# Patient Record
Sex: MALE | Race: BLACK OR AFRICAN AMERICAN | NOT HISPANIC OR LATINO | URBAN - METROPOLITAN AREA
[De-identification: names, ages, dates, MRNs, and addresses within clinical notes are randomized per-mention and may not be internally consistent; named-entity substitution may affect disease eponyms.]

---

## 2021-02-11 ENCOUNTER — OUTPATIENT (OUTPATIENT)
Dept: OUTPATIENT SERVICES | Facility: HOSPITAL | Age: 76
LOS: 1 days | Discharge: HOME | End: 2021-02-11

## 2021-02-11 ENCOUNTER — INPATIENT (INPATIENT)
Facility: HOSPITAL | Age: 76
LOS: 0 days | Discharge: HOME HEALTH | End: 2021-02-12
Attending: INTERNAL MEDICINE | Admitting: INTERNAL MEDICINE
Payer: MEDICARE

## 2021-02-11 VITALS
RESPIRATION RATE: 18 BRPM | DIASTOLIC BLOOD PRESSURE: 72 MMHG | HEART RATE: 64 BPM | SYSTOLIC BLOOD PRESSURE: 158 MMHG | OXYGEN SATURATION: 95 %

## 2021-02-11 DIAGNOSIS — Z79.82 LONG TERM (CURRENT) USE OF ASPIRIN: ICD-10-CM

## 2021-02-11 DIAGNOSIS — S98.139A COMPLETE TRAUMATIC AMPUTATION OF ONE UNSPECIFIED LESSER TOE, INITIAL ENCOUNTER: Chronic | ICD-10-CM

## 2021-02-11 DIAGNOSIS — E78.00 PURE HYPERCHOLESTEROLEMIA, UNSPECIFIED: ICD-10-CM

## 2021-02-11 DIAGNOSIS — N18.9 CHRONIC KIDNEY DISEASE, UNSPECIFIED: ICD-10-CM

## 2021-02-11 DIAGNOSIS — I70.209 UNSPECIFIED ATHEROSCLEROSIS OF NATIVE ARTERIES OF EXTREMITIES, UNSPECIFIED EXTREMITY: ICD-10-CM

## 2021-02-11 DIAGNOSIS — E11.22 TYPE 2 DIABETES MELLITUS WITH DIABETIC CHRONIC KIDNEY DISEASE: ICD-10-CM

## 2021-02-11 DIAGNOSIS — Z79.02 LONG TERM (CURRENT) USE OF ANTITHROMBOTICS/ANTIPLATELETS: ICD-10-CM

## 2021-02-11 DIAGNOSIS — I12.9 HYPERTENSIVE CHRONIC KIDNEY DISEASE WITH STAGE 1 THROUGH STAGE 4 CHRONIC KIDNEY DISEASE, OR UNSPECIFIED CHRONIC KIDNEY DISEASE: ICD-10-CM

## 2021-02-11 LAB
ANION GAP SERPL CALC-SCNC: 14 MMOL/L — SIGNIFICANT CHANGE UP (ref 7–14)
BUN SERPL-MCNC: 65 MG/DL — CRITICAL HIGH (ref 10–20)
CALCIUM SERPL-MCNC: 8.4 MG/DL — LOW (ref 8.5–10.1)
CHLORIDE SERPL-SCNC: 101 MMOL/L — SIGNIFICANT CHANGE UP (ref 98–110)
CO2 SERPL-SCNC: 18 MMOL/L — SIGNIFICANT CHANGE UP (ref 17–32)
CREAT SERPL-MCNC: 4.2 MG/DL — CRITICAL HIGH (ref 0.7–1.5)
GLUCOSE BLDC GLUCOMTR-MCNC: 163 MG/DL — HIGH (ref 70–99)
GLUCOSE BLDC GLUCOMTR-MCNC: 173 MG/DL — HIGH (ref 70–99)
GLUCOSE BLDC GLUCOMTR-MCNC: 202 MG/DL — HIGH (ref 70–99)
GLUCOSE SERPL-MCNC: 180 MG/DL — HIGH (ref 70–99)
HCT VFR BLD CALC: 31.8 % — LOW (ref 42–52)
HGB BLD-MCNC: 9.9 G/DL — LOW (ref 14–18)
MCHC RBC-ENTMCNC: 27.1 PG — SIGNIFICANT CHANGE UP (ref 27–31)
MCHC RBC-ENTMCNC: 31.1 G/DL — LOW (ref 32–37)
MCV RBC AUTO: 87.1 FL — SIGNIFICANT CHANGE UP (ref 80–94)
NRBC # BLD: 0 /100 WBCS — SIGNIFICANT CHANGE UP (ref 0–0)
PLATELET # BLD AUTO: 191 K/UL — SIGNIFICANT CHANGE UP (ref 130–400)
POTASSIUM SERPL-MCNC: 5.1 MMOL/L — HIGH (ref 3.5–5)
POTASSIUM SERPL-SCNC: 5.1 MMOL/L — HIGH (ref 3.5–5)
RBC # BLD: 3.65 M/UL — LOW (ref 4.7–6.1)
RBC # FLD: 15.1 % — HIGH (ref 11.5–14.5)
SODIUM SERPL-SCNC: 133 MMOL/L — LOW (ref 135–146)
WBC # BLD: 6.28 K/UL — SIGNIFICANT CHANGE UP (ref 4.8–10.8)
WBC # FLD AUTO: 6.28 K/UL — SIGNIFICANT CHANGE UP (ref 4.8–10.8)

## 2021-02-11 PROCEDURE — 93010 ELECTROCARDIOGRAM REPORT: CPT

## 2021-02-11 PROCEDURE — 37228: CPT | Mod: RT

## 2021-02-11 PROCEDURE — 75710 ARTERY X-RAYS ARM/LEG: CPT | Mod: 26,XU

## 2021-02-11 PROCEDURE — 37224: CPT | Mod: LT

## 2021-02-11 PROCEDURE — 36246 INS CATH ABD/L-EXT ART 2ND: CPT | Mod: 59

## 2021-02-11 RX ORDER — SODIUM CHLORIDE 9 MG/ML
1000 INJECTION, SOLUTION INTRAVENOUS
Refills: 0 | Status: DISCONTINUED | OUTPATIENT
Start: 2021-02-11 | End: 2021-02-12

## 2021-02-11 RX ORDER — DEXTROSE 50 % IN WATER 50 %
25 SYRINGE (ML) INTRAVENOUS ONCE
Refills: 0 | Status: DISCONTINUED | OUTPATIENT
Start: 2021-02-11 | End: 2021-02-12

## 2021-02-11 RX ORDER — CLOPIDOGREL BISULFATE 75 MG/1
1 TABLET, FILM COATED ORAL
Qty: 30 | Refills: 1
Start: 2021-02-11 | End: 2021-04-11

## 2021-02-11 RX ORDER — SODIUM CHLORIDE 9 MG/ML
1000 INJECTION INTRAMUSCULAR; INTRAVENOUS; SUBCUTANEOUS
Refills: 0 | Status: DISCONTINUED | OUTPATIENT
Start: 2021-02-11 | End: 2021-02-12

## 2021-02-11 RX ORDER — GLUCAGON INJECTION, SOLUTION 0.5 MG/.1ML
1 INJECTION, SOLUTION SUBCUTANEOUS ONCE
Refills: 0 | Status: DISCONTINUED | OUTPATIENT
Start: 2021-02-11 | End: 2021-02-12

## 2021-02-11 RX ORDER — INSULIN GLARGINE 100 [IU]/ML
15 INJECTION, SOLUTION SUBCUTANEOUS AT BEDTIME
Refills: 0 | Status: DISCONTINUED | OUTPATIENT
Start: 2021-02-11 | End: 2021-02-12

## 2021-02-11 RX ORDER — ATORVASTATIN CALCIUM 80 MG/1
40 TABLET, FILM COATED ORAL AT BEDTIME
Refills: 0 | Status: DISCONTINUED | OUTPATIENT
Start: 2021-02-11 | End: 2021-02-12

## 2021-02-11 RX ORDER — AMLODIPINE BESYLATE 2.5 MG/1
5 TABLET ORAL DAILY
Refills: 0 | Status: DISCONTINUED | OUTPATIENT
Start: 2021-02-11 | End: 2021-02-12

## 2021-02-11 RX ORDER — DEXTROSE 50 % IN WATER 50 %
15 SYRINGE (ML) INTRAVENOUS ONCE
Refills: 0 | Status: DISCONTINUED | OUTPATIENT
Start: 2021-02-11 | End: 2021-02-12

## 2021-02-11 RX ORDER — DEXTROSE 50 % IN WATER 50 %
12.5 SYRINGE (ML) INTRAVENOUS ONCE
Refills: 0 | Status: DISCONTINUED | OUTPATIENT
Start: 2021-02-11 | End: 2021-02-12

## 2021-02-11 RX ORDER — ASPIRIN/CALCIUM CARB/MAGNESIUM 324 MG
1 TABLET ORAL
Qty: 30 | Refills: 1
Start: 2021-02-11 | End: 2021-04-11

## 2021-02-11 RX ORDER — CLOPIDOGREL BISULFATE 75 MG/1
75 TABLET, FILM COATED ORAL DAILY
Refills: 0 | Status: DISCONTINUED | OUTPATIENT
Start: 2021-02-12 | End: 2021-02-12

## 2021-02-11 RX ORDER — ASPIRIN/CALCIUM CARB/MAGNESIUM 324 MG
81 TABLET ORAL DAILY
Refills: 0 | Status: DISCONTINUED | OUTPATIENT
Start: 2021-02-12 | End: 2021-02-12

## 2021-02-11 RX ORDER — INSULIN LISPRO 100/ML
VIAL (ML) SUBCUTANEOUS
Refills: 0 | Status: DISCONTINUED | OUTPATIENT
Start: 2021-02-11 | End: 2021-02-12

## 2021-02-11 RX ADMIN — Medication 1: at 17:41

## 2021-02-11 RX ADMIN — INSULIN GLARGINE 15 UNIT(S): 100 INJECTION, SOLUTION SUBCUTANEOUS at 22:45

## 2021-02-11 RX ADMIN — ATORVASTATIN CALCIUM 40 MILLIGRAM(S): 80 TABLET, FILM COATED ORAL at 22:45

## 2021-02-11 RX ADMIN — SODIUM CHLORIDE 100 MILLILITER(S): 9 INJECTION INTRAMUSCULAR; INTRAVENOUS; SUBCUTANEOUS at 15:15

## 2021-02-11 NOTE — CHART NOTE - NSCHARTNOTEFT_GEN_A_CORE
INDICATION:                          CLI right leg                           Emanuel class 5    PHYSICIAN: Dr Brizuela, DR Hill  ASSISTANT/FELLOW: Jared    ACCESS: Ultrasound Guided  Left femoral artery access    VASCULAR CLOSURE DEVICE (if applicable):    ANGIOGRAM:  Selective Abdominal Aorta, Right Iliac, Right SFA, Right Lower Extremity DSA angiography     DETAILED PROCEDURE SUMMARY:  After obtaining informed consent, the patient was brought to the catheterization suite in a post- absorptive and non-sedated state. After this, a timeout was performed and I administered moderate sedation throughout this 45-minute procedure. An independent trained observer pushed medications at my direction, and monitored the patient’s level of consciousness and physiological status throughout. The patient was prepped and draped in the usual sterile manner. 1% lidocaine was used for local anesthesia and the patient was sedated as per endovascular lab protocol. Access was obtained in the Left  Femoral Artery using the modified Seldinger technique  5 [place] was placed in the artery under fluoroscopy and ultrasound guidance which was utilized for assessment of arterial patency and actual real-time visualization of needle passage to the arterial lumen.     CONTRAST: 40 ml    DIAGNOSTIC FINDINGS    Abdominal Aorta: patent   Right Common Iliac Artery: patent   Right External Iliac Artery: patent  Right Common Femoral Artery: patent   Right SFA Artery:  moderate disease Distal 70% lesion   Right Profunda Artery: moderate disease  Right Popliteal Artery: 80% diffuse lesion   Right Tibial Peroneal Trunk Artery: 90% diffuse disease  Reft Anterior Tibial Artery: severe disease occluded distally supplied by collaterals   Right Peroneal Artery: 90-95% diffuse disease, 100% in distally  with blood reflow distally from prox collaterals   Right Posterior Tibial Artery: severe disease  Left Common Iliac Artery: Patent         COMPLICATION: none     PERIPHERAL DIAGNOSTIC ANGIOGRAM/INTERVENTION SUMMARY:    -Significant right popliteal lesion   -Significant right TP trunk lesion   -Significant right peroneal diffuse lesion,   -Occluded AT and severely disease PT.     Intervention:  -Successful PCI to right TP trunk/peroneal with balloon angioplasty  -Successful PCI to right Distal SFA/popliteal lesion with DCB      RECOMMENDATIONs:  -admit for observation   -IV hydration monitor kidney function  -ASA 81 mg daily  -plavix 75 mg daily  -continue statin and CCB  -Plan to DC tera if stable

## 2021-02-11 NOTE — H&P CARDIOLOGY - HISTORY OF PRESENT ILLNESS
Patient is a 75y Male PMH: HTN, HLD, DM, CKD IV, R foot ulcer, multiple toe amputations R foot, PVD, [place]. Pt here with Benito Cat5-nonhealing ulcer, arterial duplex reveals  of B/L PTA and right peroneal artery, decreased velocities of b/l AT arteries, diminished arterial flow of b/l DPA, peripheral angiogram recommended. Of note,       Vital Signs Last 24 Hrs  T(C): --  T(F): --  HR: --72  BP: --178/83  BP(mean): --133  RR: --17  SpO2: --99          REVIEW OF SYSTEMS:  CONSTITUTIONAL: No fever, weight loss, or fatigue  CARDIOLOGY: PAtient denies chest pain, shortness of breath or syncopal episodes.   RESPIRATORY: denies shortness of breath, wheezing.   NEUROLOGICAL: NO weakness, no focal deficits to report.  ENDOCRINOLOGICAL: no recent change in diabetic medications.   GI: no BRBPR, no N,V,diarrhea.     PHYSICAL EXAM:  · CONSTITUTIONAL:	Well-developed, well nourished      ·RESPIRATORY:   airway patent; breath sounds equal; good air movement; respirations non-labored; clear to auscultation bilaterally; no chest wall tenderness; no intercostal retractions; no rales,rhonchi or wheeze  · CARDIOVASCULAR	regular rate and rhythm  no rub  no murmur  normal PMI  · EXTREMITIES: No cyanosis, clubbing or edema, R toe 2-4 amputation   · VASCULAR: 	DP absent , 1+ PT  	         Patient is a 75y Male PMH: HTN, HLD, DM, CKD IV, R foot ulcer, multiple toe amputations R foot, PVD, [place]. Pt here with Benito Cat5-nonhealing ulcer, arterial duplex reveals  of B/L PTA and right peroneal artery, decreased velocities of b/l AT arteries, diminished arterial flow of b/l DPA, peripheral angiogram recommended. Of note, pt is extremely [place] and poor historian.       Vital Signs Last 24 Hrs  T(C): --  T(F): --  HR: --72  BP: --178/83  BP(mean): --133  RR: --17  SpO2: --99          REVIEW OF SYSTEMS:  CONSTITUTIONAL: No fever, weight loss, or fatigue  CARDIOLOGY: PAtient denies chest pain, shortness of breath or syncopal episodes.   RESPIRATORY: denies shortness of breath, wheezing.   NEUROLOGICAL: NO weakness, no focal deficits to report.  ENDOCRINOLOGICAL: no recent change in diabetic medications.   GI: no BRBPR, no N,V,diarrhea.     PHYSICAL EXAM:  · CONSTITUTIONAL:	Well-developed, well nourished      ·RESPIRATORY:   airway patent; breath sounds equal; good air movement; respirations non-labored; clear to auscultation bilaterally; no chest wall tenderness; no intercostal retractions; no rales,rhonchi or wheeze  · CARDIOVASCULAR	regular rate and rhythm  no rub  no murmur  normal PMI  · EXTREMITIES: No cyanosis, clubbing or edema, R toe 2-4 amputation   · VASCULAR: 	DP absent , 1+ PT

## 2021-02-11 NOTE — ASU PATIENT PROFILE, ADULT - PMH
CKD (chronic kidney disease), stage IV    Diabetes    HLD (hyperlipidemia)    [place] (hard of hearing)  does not perform sign language, does not wear any assistive devices  HTN (hypertension)    PAD (peripheral artery disease)    PVD (peripheral vascular disease) with claudication

## 2021-02-12 VITALS — HEART RATE: 62 BPM | SYSTOLIC BLOOD PRESSURE: 152 MMHG | DIASTOLIC BLOOD PRESSURE: 76 MMHG

## 2021-02-12 LAB
A1C WITH ESTIMATED AVERAGE GLUCOSE RESULT: 6.8 % — HIGH (ref 4–5.6)
ANION GAP SERPL CALC-SCNC: 9 MMOL/L — SIGNIFICANT CHANGE UP (ref 7–14)
BASOPHILS # BLD AUTO: 0.02 K/UL — SIGNIFICANT CHANGE UP (ref 0–0.2)
BASOPHILS NFR BLD AUTO: 0.4 % — SIGNIFICANT CHANGE UP (ref 0–1)
BUN SERPL-MCNC: 71 MG/DL — CRITICAL HIGH (ref 10–20)
CALCIUM SERPL-MCNC: 8.1 MG/DL — LOW (ref 8.5–10.1)
CHLORIDE SERPL-SCNC: 109 MMOL/L — SIGNIFICANT CHANGE UP (ref 98–110)
CO2 SERPL-SCNC: 17 MMOL/L — SIGNIFICANT CHANGE UP (ref 17–32)
CREAT SERPL-MCNC: 3.4 MG/DL — HIGH (ref 0.7–1.5)
EOSINOPHIL # BLD AUTO: 0.12 K/UL — SIGNIFICANT CHANGE UP (ref 0–0.7)
EOSINOPHIL NFR BLD AUTO: 2.2 % — SIGNIFICANT CHANGE UP (ref 0–8)
ESTIMATED AVERAGE GLUCOSE: 148 MG/DL — HIGH (ref 68–114)
GLUCOSE BLDC GLUCOMTR-MCNC: 259 MG/DL — HIGH (ref 70–99)
GLUCOSE BLDC GLUCOMTR-MCNC: 94 MG/DL — SIGNIFICANT CHANGE UP (ref 70–99)
GLUCOSE SERPL-MCNC: 97 MG/DL — SIGNIFICANT CHANGE UP (ref 70–99)
HCT VFR BLD CALC: 31.1 % — LOW (ref 42–52)
HGB BLD-MCNC: 9.8 G/DL — LOW (ref 14–18)
IMM GRANULOCYTES NFR BLD AUTO: 0.4 % — HIGH (ref 0.1–0.3)
LYMPHOCYTES # BLD AUTO: 1.25 K/UL — SIGNIFICANT CHANGE UP (ref 1.2–3.4)
LYMPHOCYTES # BLD AUTO: 22.9 % — SIGNIFICANT CHANGE UP (ref 20.5–51.1)
MCHC RBC-ENTMCNC: 27.2 PG — SIGNIFICANT CHANGE UP (ref 27–31)
MCHC RBC-ENTMCNC: 31.5 G/DL — LOW (ref 32–37)
MCV RBC AUTO: 86.4 FL — SIGNIFICANT CHANGE UP (ref 80–94)
MONOCYTES # BLD AUTO: 0.49 K/UL — SIGNIFICANT CHANGE UP (ref 0.1–0.6)
MONOCYTES NFR BLD AUTO: 9 % — SIGNIFICANT CHANGE UP (ref 1.7–9.3)
NEUTROPHILS # BLD AUTO: 3.55 K/UL — SIGNIFICANT CHANGE UP (ref 1.4–6.5)
NEUTROPHILS NFR BLD AUTO: 65.1 % — SIGNIFICANT CHANGE UP (ref 42.2–75.2)
NRBC # BLD: 0 /100 WBCS — SIGNIFICANT CHANGE UP (ref 0–0)
PLATELET # BLD AUTO: 161 K/UL — SIGNIFICANT CHANGE UP (ref 130–400)
POTASSIUM SERPL-MCNC: 5.2 MMOL/L — HIGH (ref 3.5–5)
POTASSIUM SERPL-SCNC: 5.2 MMOL/L — HIGH (ref 3.5–5)
RBC # BLD: 3.6 M/UL — LOW (ref 4.7–6.1)
RBC # FLD: 15.1 % — HIGH (ref 11.5–14.5)
SODIUM SERPL-SCNC: 135 MMOL/L — SIGNIFICANT CHANGE UP (ref 135–146)
WBC # BLD: 5.45 K/UL — SIGNIFICANT CHANGE UP (ref 4.8–10.8)
WBC # FLD AUTO: 5.45 K/UL — SIGNIFICANT CHANGE UP (ref 4.8–10.8)

## 2021-02-12 PROCEDURE — 99232 SBSQ HOSP IP/OBS MODERATE 35: CPT

## 2021-02-12 RX ORDER — INSULIN GLARGINE 100 [IU]/ML
0 INJECTION, SOLUTION SUBCUTANEOUS
Qty: 0 | Refills: 0 | DISCHARGE

## 2021-02-12 RX ORDER — AMLODIPINE BESYLATE 2.5 MG/1
1 TABLET ORAL
Qty: 0 | Refills: 0 | DISCHARGE

## 2021-02-12 RX ORDER — AMLODIPINE BESYLATE 2.5 MG/1
5 TABLET ORAL ONCE
Refills: 0 | Status: COMPLETED | OUTPATIENT
Start: 2021-02-12 | End: 2021-02-12

## 2021-02-12 RX ORDER — HYDROCHLOROTHIAZIDE 25 MG
25 TABLET ORAL DAILY
Refills: 0 | Status: DISCONTINUED | OUTPATIENT
Start: 2021-02-12 | End: 2021-02-12

## 2021-02-12 RX ORDER — AMLODIPINE BESYLATE 2.5 MG/1
1 TABLET ORAL
Qty: 0 | Refills: 0 | DISCHARGE
Start: 2021-02-12

## 2021-02-12 RX ORDER — ATORVASTATIN CALCIUM 80 MG/1
1 TABLET, FILM COATED ORAL
Qty: 0 | Refills: 0 | DISCHARGE

## 2021-02-12 RX ORDER — INSULIN GLARGINE 100 [IU]/ML
7 INJECTION, SOLUTION SUBCUTANEOUS
Qty: 0 | Refills: 0 | DISCHARGE

## 2021-02-12 RX ADMIN — CLOPIDOGREL BISULFATE 75 MILLIGRAM(S): 75 TABLET, FILM COATED ORAL at 11:44

## 2021-02-12 RX ADMIN — AMLODIPINE BESYLATE 5 MILLIGRAM(S): 2.5 TABLET ORAL at 06:10

## 2021-02-12 RX ADMIN — Medication 81 MILLIGRAM(S): at 11:44

## 2021-02-12 RX ADMIN — Medication 25 MILLIGRAM(S): at 10:39

## 2021-02-12 RX ADMIN — Medication 3: at 11:44

## 2021-02-12 RX ADMIN — AMLODIPINE BESYLATE 5 MILLIGRAM(S): 2.5 TABLET ORAL at 07:01

## 2021-02-12 NOTE — DISCHARGE NOTE PROVIDER - NSDCACTIVITY_GEN_ALL_CORE
Showering allowed/No heavy lifting/straining Do not drive or operate machinery/Showering allowed/No heavy lifting/straining

## 2021-02-12 NOTE — DISCHARGE NOTE PROVIDER - NSDCCPTREATMENT_GEN_ALL_CORE_FT
PRINCIPAL PROCEDURE  Procedure: Angiogram, peripheral, with PTA if indicated  Findings and Treatment: continue medical regimen as prescribed

## 2021-02-12 NOTE — DISCHARGE NOTE PROVIDER - NSDCCPCAREPLAN_GEN_ALL_CORE_FT
PRINCIPAL DISCHARGE DIAGNOSIS  Diagnosis: PAD (peripheral artery disease)  Assessment and Plan of Treatment: continue medical regimen as prescribed

## 2021-02-12 NOTE — DISCHARGE NOTE PROVIDER - NSDCCPGOAL_GEN_ALL_CORE_FT
continue to medical regimen to prevent lower extremity pain. To get better and follow your care plan as instructed.

## 2021-02-12 NOTE — DISCHARGE NOTE PROVIDER - HOSPITAL COURSE
Patient is a 75y Male PMH: HTN, HLD, DM, CKD IV, R foot ulcer, multiple toe amputations R foot, PVD, [place]. Pt here with Benito Cat5-nonhealing ulcer, arterial duplex reveals  of B/L PTA and right peroneal artery, decreased velocities of b/l AT arteries, diminished arterial flow of b/l DPA, peripheral angiogram recommended. Of note, pt is extremely [place] and poor historian.         S/P PTA:   PERIPHERAL DIAGNOSTIC ANGIOGRAM/INTERVENTION SUMMARY:    -Significant right popliteal lesion   -Significant right TP trunk lesion   -Significant right peroneal diffuse lesion,   -Occluded AT and severely disease PT.     Intervention:  -Successful PCI to right TP trunk/peroneal with balloon angioplasty  -Successful PCI to right Distal SFA/popliteal lesion with DCB      RECOMMENDATIONs:  -admit for observation   -IV hydration monitor kidney function  -ASA 81 mg daily  -plavix 75 mg daily  -continue statin and CCB  -Plan to DC tera if stable.      Patient is a 75y Male PMH: HTN, HLD, DM, CKD IV, R foot ulcer, multiple toe amputations R foot, PVD, [place]. Pt here with Benito Cat5-nonhealing ulcer, arterial duplex reveals  of B/L PTA and right peroneal artery, decreased velocities of b/l AT arteries, diminished arterial flow of b/l DPA, peripheral angiogram recommended. Of note, pt is extremely [place] and poor historian.         S/P PTA:   PERIPHERAL DIAGNOSTIC ANGIOGRAM/INTERVENTION SUMMARY:    -Significant right popliteal lesion   -Significant right TP trunk lesion   -Significant right peroneal diffuse lesion,   -Occluded AT and severely disease PT.     Intervention:  -Successful PCI to right TP trunk/peroneal with balloon angioplasty  -Successful PCI to right Distal SFA/popliteal lesion with DCB      RECOMMENDATIONs:  -admit for observation   -IV hydration monitor kidney function  -ASA 81 mg daily  -plavix 75 mg daily  -continue statin and CCB  -Plan to DC home if stable.

## 2021-02-12 NOTE — DISCHARGE NOTE NURSING/CASE MANAGEMENT/SOCIAL WORK - PATIENT PORTAL LINK FT
You can access the FollowMyHealth Patient Portal offered by [facility] by registering at the following website: [email or web address]. By joining [facility]’s FollowMyHealth portal, you will also be able to view your health information using other applications (apps) compatible with our system.

## 2021-02-12 NOTE — DISCHARGE NOTE PROVIDER - CARE PROVIDER_API CALL
Iam Brizuela (MD)  Cardiovascular Disease; Endovascular Medicine; Interventional Cardiology; Vascular Medicine  [address] [place] [address], [place] [address]  Phone: [phone]  Fax: [phone]  Follow Up Time: 1 week

## 2021-02-12 NOTE — DISCHARGE NOTE PROVIDER - NSDCFUADDINST_GEN_ALL_CORE_FT
Discharge instructions as follows:  - No strenuous activity for 3 days (routine walking okay)  - No heavy lifting >30lbs for 2 weeks  - Some swelling to the leg is expected  - May shower in 24 hours, no baths or pools  - Leave dressing in place for 24-48 hours, if does not fall off on own may remove after 48 hours  -** follow up CLI/wound x1 week

## 2021-02-12 NOTE — DISCHARGE NOTE PROVIDER - NSDCMRMEDTOKEN_GEN_ALL_CORE_FT
amLODIPine 5 mg oral tablet: 1 tab(s) orally once a day  aspirin 81 mg oral tablet, chewable: 1 tab(s) chewed once a day MDD:1  clopidogrel 75 mg oral tablet: 1 tab(s) orally once a day MDD:1  glipiZIDE 5 mg oral tablet, extended release: 1 tab(s) orally once a day  hydroCHLOROthiazide 25 mg oral tablet: 1 tab(s) orally once a day  Lantus 100 units/mL subcutaneous solution:   Lipitor 40 mg oral tablet: 1 tab(s) orally once a day   amLODIPine 10 mg oral tablet: 1 tab(s) orally once a day  aspirin 81 mg oral tablet, chewable: 1 tab(s) chewed once a day MDD:1  clopidogrel 75 mg oral tablet: 1 tab(s) orally once a day MDD:1  glipiZIDE 5 mg oral tablet, extended release: 1 tab(s) orally once a day  hydroCHLOROthiazide 25 mg oral tablet: 1 tab(s) orally once a day  Lantus 100 units/mL subcutaneous solution: 7 unit(s) subcutaneous once a day (at bedtime)  Lipitor 40 mg oral tablet: 1 tab(s) orally once a day

## 2021-02-12 NOTE — PROGRESS NOTE ADULT - SUBJECTIVE AND OBJECTIVE BOX
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 Pt here with [place] Cat5-nonhealing ulcer, arterial duplex reveals  of B/L PTA and right peroneal artery, decreased velocities of b/l AT arteries, diminished arterial flow of b/l DPA, peripheral angiogram recommended. Of note, pt is extremely [place] and poor historian.       Vital Signs Last 24 Hrs  T(C): --  T(F): --  HR: --72  BP: --178/83  BP(mean): --133  RR: --17  SpO2: --99          REVIEW OF SYSTEMS:  CONSTITUTIONAL: No fever, weight loss, or fatigue  CARDIOLOGY: PAtient denies chest pain, shortness of breath or syncopal episodes.   RESPIRATORY: denies shortness of breath, wheezing.   NEUROLOGICAL: NO weakness, no focal deficits to report.  ENDOCRINOLOGICAL: no recent change in diabetic medications.   GI: no BRBPR, no N,V,diarrhea.     PHYSICAL EXAM:  · CONSTITUTIONAL:	Well-developed, well nourished      ·RESPIRATORY:   airway patent; breath sounds equal; good air movement; respirations non-labored; clear to auscultation bilaterally; no chest wall tenderness; no intercostal retractions; no rales,rhonchi or wheeze  · CARDIOVASCULAR	regular rate and rhythm  no rub  no murmur  normal PMI  · EXTREMITIES: No cyanosis, clubbing or edema, R toe 2-4 amputation   · VASCULAR: 	DP absent , 1+ PT  	         (11 Feb 2021 10:44)    Allergies    No Known Allergies    Intolerances      Patient without complaints. Pt ambulated without issues/symptoms  Denies LE pain and swelling,  SOB, palpitations, or dizziness  No events on telemetry overnight    Vital Signs Last 24 Hrs  T(C): 35.7 (12 Feb 2021 05:00), Max: 36.7 (11 Feb 2021 20:24)  T(F): 96.2 (12 Feb 2021 05:00), Max: 98 (11 Feb 2021 20:24)  HR: 66 (12 Feb 2021 05:00) (64 - 68)  BP: 188/74 (12 Feb 2021 05:00) (158/72 - 188/74)  BP(mean): --  RR: 18 (12 Feb 2021 05:00) (18 - 18)  SpO2: 95% (11 Feb 2021 17:03) (95% - 95%)    MEDICATIONS  (STANDING):  amLODIPine   Tablet 5 milliGRAM(s) Oral daily  aspirin  chewable 81 milliGRAM(s) Oral daily  atorvastatin 40 milliGRAM(s) Oral at bedtime  clopidogrel Tablet 75 milliGRAM(s) Oral daily  dextrose 40% Gel 15 Gram(s) Oral once  dextrose 5%. 1000 milliLiter(s) (50 mL/Hr) IV Continuous <Continuous>  dextrose 5%. 1000 milliLiter(s) (100 mL/Hr) IV Continuous <Continuous>  dextrose 50% Injectable 25 Gram(s) IV Push once  dextrose 50% Injectable 12.5 Gram(s) IV Push once  dextrose 50% Injectable 25 Gram(s) IV Push once  glucagon  Injectable 1 milliGRAM(s) IntraMuscular once  insulin glargine Injectable (LANTUS) 15 Unit(s) SubCutaneous at bedtime  insulin lispro (ADMELOG) corrective regimen sliding scale   SubCutaneous three times a day before meals  sodium chloride 0.9%. 1000 milliLiter(s) (100 mL/Hr) IV Continuous <Continuous>    MEDICATIONS  (PRN):      REVIEW OF SYSTEMS:          All negative except as mentioned in HPI    PHYSICAL EXAM:           CONSTITUTIONAL: Well-developed; well-nourished; in no acute distress  	SKIN: warm, dry  	HEAD: Normocephalic; atraumatic  	EYES: PERRL.  	ENT: No nasal discharge, airway clear, mucous membranes moist  	NECK: Supple; non tender.  	CARD: +S1, +S2, no murmurs, gallops, or rubs. Regular rate and rhythm    	RESP: No wheezes, rales or rhonchi. CTA B/L  	ABD: soft ntnd, + BS x 4 quadrants  	EXT: moves all extremities,  no clubbing, cyanosis or edema  	NEURO: Alert and oriented x3, no focal deficits          PSYCH: Cooperative, appropriate          VASCULAR:  + Rad / + PTs / + DPs          EXTREMITY:             Left Groin:  Dressing D/C/I, pressure dressing removed, access site soft, no hematoma, no pain, + pulses, no sign of infection, no numbness  	    LABS: P                        9.9    6.28  )-----------( 191      ( 11 Feb 2021 11:10 )             31.8     02-11    133<L>  |  101  |  65<HH>  ----------------------------<  180<H>  5.1<H>   |  18  |  4.2<HH>    Ca    8.4<L>      11 Feb 2021 11:10              A/P:  I discussed the case with Cardiologist    and recommend the following:    S/P PTA:   PERIPHERAL DIAGNOSTIC ANGIOGRAM/INTERVENTION SUMMARY:    -Significant right popliteal lesion   -Significant right TP trunk lesion   -Significant right peroneal diffuse lesion,   -Occluded AT and severely disease PT.     Intervention:  -Successful PCI to right TP trunk/peroneal with balloon angioplasty  -Successful PCI to right Distal SFA/popliteal lesion with DCB      RECOMMENDATIONs:  -admit for observation   -IV hydration monitor kidney function  -ASA 81 mg daily  -plavix 75 mg daily  -continue statin and CCB  -Plan to DC tera if stable.     	     Continue DAPT ( Aspirin 81 mg PO Daily and Plavix 75 mg daily  ),  B-Blocker, Statin Therapy                   Patient given 30 day supply of ( Aspirin 81 mg daily and Plavix 75 mg daily ) to take at home                   Monitor access site                   No strenuous activity for 3 days (routine walking okay)                   No driving for 3 days                   No heavy lifting > 30lbs for 2 weeks                   May shower in 24 hours                   Leave dressing in place 24- 48 hours, if does not fall off in 48 hours can remove                   Patient agreeing to take DAPT as directed by cardiologist                    Post angiogram instructions, access site care and activity restrictions reviewed with patient                     Discussed with patient to return to hospital if experience severe lower extremity pain and site bleeding                   Aggressive risk factor modification, diet counseling, smoking cessation discussed with patient                      Can discharge patient @-- from cardiovascular standpoint after ambulating without symptoms, access site wnl, labs and ECG reviewed                    Follow up with Cardiology        in two weeks.  Instructed to call and make an appointment                                         Vascular Cardiology Follow up    SHARONA CAN   75y Male  PAST MEDICAL & SURGICAL HISTORY:  PVD (peripheral vascular disease) with claudication    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. Pt here with [place] Cat5-nonhealing ulcer, arterial duplex reveals  of B/L PTA and right peroneal artery, decreased velocities of b/l AT arteries, diminished arterial flow of b/l DPA, peripheral angiogram recommended. Of note, pt is extremely [place] and poor historian.       Vital Signs Last 24 Hrs  T(C): --  T(F): --  HR: --72  BP: --178/83  BP(mean): --133  RR: --17  SpO2: --99          REVIEW OF SYSTEMS:  CONSTITUTIONAL: No fever, weight loss, or fatigue  CARDIOLOGY: PAtient denies chest pain, shortness of breath or syncopal episodes.   RESPIRATORY: denies shortness of breath, wheezing.   NEUROLOGICAL: NO weakness, no focal deficits to report.  ENDOCRINOLOGICAL: no recent change in diabetic medications.   GI: no BRBPR, no N,V,diarrhea.     PHYSICAL EXAM:  · CONSTITUTIONAL:	Well-developed, well nourished      ·RESPIRATORY:   airway patent; breath sounds equal; good air movement; respirations non-labored; clear to auscultation bilaterally; no chest wall tenderness; no intercostal retractions; no rales,rhonchi or wheeze  · CARDIOVASCULAR	regular rate and rhythm  no rub  no murmur  normal PMI  · EXTREMITIES: No cyanosis, clubbing or edema, R toe 2-4 amputation   · VASCULAR: 	DP absent , 1+ PT  	         (11 Feb 2021 10:44)    Allergies    No Known Allergies    Intolerances      Patient without complaints. Pt ambulated without issues/symptoms  Denies LE pain and swelling,  SOB, palpitations, or dizziness  No events on telemetry overnight    Vital Signs Last 24 Hrs  T(C): 35.7 (12 Feb 2021 05:00), Max: 36.7 (11 Feb 2021 20:24)  T(F): 96.2 (12 Feb 2021 05:00), Max: 98 (11 Feb 2021 20:24)  HR: 66 (12 Feb 2021 05:00) (64 - 68)  BP: 188/74 (12 Feb 2021 05:00) (158/72 - 188/74)  BP(mean): --  RR: 18 (12 Feb 2021 05:00) (18 - 18)  SpO2: 95% (11 Feb 2021 17:03) (95% - 95%)    MEDICATIONS  (STANDING):  amLODIPine   Tablet 5 milliGRAM(s) Oral daily  aspirin  chewable 81 milliGRAM(s) Oral daily  atorvastatin 40 milliGRAM(s) Oral at bedtime  clopidogrel Tablet 75 milliGRAM(s) Oral daily  dextrose 40% Gel 15 Gram(s) Oral once  dextrose 5%. 1000 milliLiter(s) (50 mL/Hr) IV Continuous <Continuous>  dextrose 5%. 1000 milliLiter(s) (100 mL/Hr) IV Continuous <Continuous>  dextrose 50% Injectable 25 Gram(s) IV Push once  dextrose 50% Injectable 12.5 Gram(s) IV Push once  dextrose 50% Injectable 25 Gram(s) IV Push once  glucagon  Injectable 1 milliGRAM(s) IntraMuscular once  insulin glargine Injectable (LANTUS) 15 Unit(s) SubCutaneous at bedtime  insulin lispro (ADMELOG) corrective regimen sliding scale   SubCutaneous three times a day before meals  sodium chloride 0.9%. 1000 milliLiter(s) (100 mL/Hr) IV Continuous <Continuous>    MEDICATIONS  (PRN):      REVIEW OF SYSTEMS:          All negative except as mentioned in HPI    PHYSICAL EXAM:           CONSTITUTIONAL: Well-developed; well-nourished; in no acute distress  	SKIN: warm, dry  	HEAD: Normocephalic; atraumatic  	EYES: PERRL.  	ENT: No nasal discharge, airway clear, mucous membranes moist  	NECK: Supple; non tender.  	CARD: +S1, +S2, no murmurs, gallops, or rubs. Regular rate and rhythm    	RESP: No wheezes, rales or rhonchi. CTA B/L  	ABD: soft ntnd, + BS x 4 quadrants  	EXT: moves all extremities,  no clubbing, cyanosis or edema  	NEURO: Alert and oriented x3, no focal deficits          PSYCH: Cooperative, appropriate          VASCULAR:  + Rad / + PTs / + DPs          EXTREMITY:             Left Groin:  Dressing changed new dsg D/C/I, dressing to be  removed in 24 hrs if does not fall off, access site soft, no hematoma, no pain, + pulses, no sign of infection, no numbness  	    LABS:   Complete Blood Count + Automated Diff   02.12.21 @ 04:30   WBC Count K/uL   5.45   RBC Count M/uL   3.60   Hemoglobin g/dL   9.8   Hematocrit %   31.1   Mean Cell Volume fL   86.4   Mean Cell Hemoglobin pg   27.2   Mean Cell Hemoglobin Conc g/dL   31.5   Red Cell Distrib Width %   15.1   Platelet Count - Automated K/uL   161   Auto Neutrophil # K/uL   3.55   Auto Lymphocyte # K/uL   1.25   Auto Monocyte # K/uL   0.49   Auto Eosinophil # K/uL   0.12   Auto Basophil # K/uL   0.02   Auto Neutrophil % %   65.1: Differential percentages must be correlated with absolute numbers for clinical significance.   Auto Lymphocyte % %   22.9   Auto Monocyte % %   9.0   Auto Eosinophil % %   2.2   Auto Basophil % %   0.4   Auto Immature Granulocyte % %   0.4   Nucleated RBC /100 WBCs   0   Basic Metabolic Panel   02.12.21 @ 04:30   Sodium, Serum mmol/L   135   Potassium, Serum mmol/L   5.2   Chloride, Serum mmol/L   109   Carbon Dioxide, Serum mmol/L   17   Anion Gap, Serum mmol/L   9   Blood Urea Nitrogen, Serum mg/dL   71: Critical value:   Creatinine, Serum mg/dL   3.4   Glucose, Serum mg/dL   97   Calcium, Total Serum mg/dL   8.1   eGFR if Non African American mL/min/1.73M2   17: Interpretative comment The units for eGFR are mL/min/1.73M2 (normalized body surface area). The eGFR is calculated from a serum creatinine using the CKD-EPI equation. Other variables required for calculation are race, age and sex. Among patients with chronic kidney disease (CKD), the eGFR is useful in determining the stage of disease according to KDOQI CKD classification. All eGFR results are reported numerically with the following interpretation. GFR With Without (ml/min/1.73 m2) Kidney Damage Kidney Damage >= 90 Stage 1 Normal 60-89 Stage 2 Decreased GFR 30-59 Stage 3 Stage 3 15-29 Stage 4 Stage 4 < 15 Stage 5 Stage 5 Each stage of CKD assumes that the associated GFR level has been in effect for at least 3 months. Determination of stages one and two (with eGFR > 59 ml/min/m2) requires estimation of kidney damage for at least 3 months as defined by structural or functional abnormalities. Limitations: All estimates of GFR will be less accurate for patients at extremes of muscle mass (including but not limited to frail elderly, critically ill, or cancer patients), those with unusual diets, and those with conditions associated with reduced secretion or extrarenal elimination of creatinine. The eGFR equation is not recommended for use in patients with unstable creatinine levels.   eGFR if African American mL/min/1.73M2   19                        9.9    6.28  )-----------( 191      ( 11 Feb 2021 11:10 )             31.8     02-11    133<L>  |  101  |  65<HH>  ----------------------------<  180<H>  5.1<H>   |  18  |  4.2<HH>    Ca    8.4<L>      11 Feb 2021 11:10              A/P:  I discussed the case with Cardiologist    and recommend the following:    S/P PTA:   PERIPHERAL DIAGNOSTIC ANGIOGRAM/INTERVENTION SUMMARY:    -Significant right popliteal lesion   -Significant right TP trunk lesion   -Significant right peroneal diffuse lesion,   -Occluded AT and severely disease PT.     Intervention:  -Successful PCI to right TP trunk/peroneal with balloon angioplasty  -Successful PCI to right Distal SFA/popliteal lesion with DCB      RECOMMENDATIONs:  -admit for observation   -IV hydration monitor kidney function  -ASA 81 mg daily  -plavix 75 mg daily  -continue statin and CCB  -Plan to DC tera if stable.     	     Continue DAPT ( Aspirin 81 mg PO Daily and Plavix 75 mg daily  ),  B-Blocker, Statin Therapy                   Patient given 30 day supply of ( Aspirin 81 mg daily and Plavix 75 mg daily ) to take at home                   Monitor access site                   No strenuous activity for 3 days (routine walking okay)                   No driving for 3 days                   No heavy lifting > 30lbs for 2 weeks                   May shower in 24 hours                   Leave dressing in place 24- 48 hours, if does not fall off in 48 hours can remove                   Patient agreeing to take DAPT as directed by cardiologist                    Post angiogram instructions, access site care and activity restrictions reviewed with patient                     Discussed with patient to return to hospital if experience severe lower extremity pain and site bleeding                   Aggressive risk factor modification, diet counseling, smoking cessation discussed with patient                      Can discharge patien from cardiovascular standpoint after ambulating without symptoms, access site wnl, labs and ECG reviewed                    Follow up with Cardiology  in 1 week.  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 Pt here with [place] Cat5-nonhealing ulcer, arterial duplex reveals  of B/L PTA and right peroneal artery, decreased velocities of b/l AT arteries, diminished arterial flow of b/l DPA, peripheral angiogram recommended. Of note, pt is extremely [place] and poor historian.       Vital Signs Last 24 Hrs  T(C): --  T(F): --  HR: --72  BP: --178/83  BP(mean): --133  RR: --17  SpO2: --99          REVIEW OF SYSTEMS:  CONSTITUTIONAL: No fever, weight loss, or fatigue  CARDIOLOGY: PAtient denies chest pain, shortness of breath or syncopal episodes.   RESPIRATORY: denies shortness of breath, wheezing.   NEUROLOGICAL: NO weakness, no focal deficits to report.  ENDOCRINOLOGICAL: no recent change in diabetic medications.   GI: no BRBPR, no N,V,diarrhea.     PHYSICAL EXAM:  · CONSTITUTIONAL:	Well-developed, well nourished      ·RESPIRATORY:   airway patent; breath sounds equal; good air movement; respirations non-labored; clear to auscultation bilaterally; no chest wall tenderness; no intercostal retractions; no rales,rhonchi or wheeze  · CARDIOVASCULAR	regular rate and rhythm  no rub  no murmur  normal PMI  · EXTREMITIES: No cyanosis, clubbing or edema, R toe 2-4 amputation   · VASCULAR: 	DP absent , 1+ PT  	         (11 Feb 2021 10:44)    Allergies    No Known Allergies    Intolerances      Patient without complaints. Pt ambulated without issues/symptoms  Denies LE pain and swelling,  SOB, palpitations, or dizziness  No events on telemetry overnight    Vital Signs Last 24 Hrs  T(C): 35.7 (12 Feb 2021 05:00), Max: 36.7 (11 Feb 2021 20:24)  T(F): 96.2 (12 Feb 2021 05:00), Max: 98 (11 Feb 2021 20:24)  HR: 66 (12 Feb 2021 05:00) (64 - 68)  BP: 188/74 (12 Feb 2021 05:00) (158/72 - 188/74)  BP(mean): --  RR: 18 (12 Feb 2021 05:00) (18 - 18)  SpO2: 95% (11 Feb 2021 17:03) (95% - 95%)    MEDICATIONS  (STANDING):  amLODIPine   Tablet 5 milliGRAM(s) Oral daily  aspirin  chewable 81 milliGRAM(s) Oral daily  atorvastatin 40 milliGRAM(s) Oral at bedtime  clopidogrel Tablet 75 milliGRAM(s) Oral daily  dextrose 40% Gel 15 Gram(s) Oral once  dextrose 5%. 1000 milliLiter(s) (50 mL/Hr) IV Continuous <Continuous>  dextrose 5%. 1000 milliLiter(s) (100 mL/Hr) IV Continuous <Continuous>  dextrose 50% Injectable 25 Gram(s) IV Push once  dextrose 50% Injectable 12.5 Gram(s) IV Push once  dextrose 50% Injectable 25 Gram(s) IV Push once  glucagon  Injectable 1 milliGRAM(s) IntraMuscular once  insulin glargine Injectable (LANTUS) 15 Unit(s) SubCutaneous at bedtime  insulin lispro (ADMELOG) corrective regimen sliding scale   SubCutaneous three times a day before meals  sodium chloride 0.9%. 1000 milliLiter(s) (100 mL/Hr) IV Continuous <Continuous>    MEDICATIONS  (PRN):      REVIEW OF SYSTEMS:          All negative except as mentioned in HPI    PHYSICAL EXAM:           CONSTITUTIONAL: Well-developed; well-nourished; in no acute distress  	SKIN: warm, dry  	HEAD: Normocephalic; atraumatic  	EYES: PERRL.  	ENT: No nasal discharge, airway clear, mucous membranes moist  	NECK: Supple; non tender.  	CARD: +S1, +S2, no murmurs, gallops, or rubs. Regular rate and rhythm    	RESP: No wheezes, rales or rhonchi. CTA B/L  	ABD: soft ntnd, + BS x 4 quadrants  	EXT: moves all extremities,  no clubbing, cyanosis or edema  	NEURO: Alert and oriented x3, no focal deficits          PSYCH: Cooperative, appropriate          VASCULAR:  + Rad / + PTs / + DPs          EXTREMITY:             Left Groin:  Dressing changed new dsg D/C/I, dressing to be  removed in 24 hrs if does not fall off, access site soft, no hematoma, no pain, + pulses, no sign of infection, no numbness  	    LABS:   Complete Blood Count + Automated Diff   02.12.21 @ 04:30   WBC Count K/uL   5.45   RBC Count M/uL   3.60   Hemoglobin g/dL   9.8   Hematocrit %   31.1   Mean Cell Volume fL   86.4   Mean Cell Hemoglobin pg   27.2   Mean Cell Hemoglobin Conc g/dL   31.5   Red Cell Distrib Width %   15.1   Platelet Count - Automated K/uL   161   Auto Neutrophil # K/uL   3.55   Auto Lymphocyte # K/uL   1.25   Auto Monocyte # K/uL   0.49   Auto Eosinophil # K/uL   0.12   Auto Basophil # K/uL   0.02   Auto Neutrophil % %   65.1: Differential percentages must be correlated with absolute numbers for clinical significance.   Auto Lymphocyte % %   22.9   Auto Monocyte % %   9.0   Auto Eosinophil % %   2.2   Auto Basophil % %   0.4   Auto Immature Granulocyte % %   0.4   Nucleated RBC /100 WBCs   0   Basic Metabolic Panel   02.12.21 @ 04:30   Sodium, Serum mmol/L   135   Potassium, Serum mmol/L   5.2   Chloride, Serum mmol/L   109   Carbon Dioxide, Serum mmol/L   17   Anion Gap, Serum mmol/L   9   Blood Urea Nitrogen, Serum mg/dL   71: Critical value:   Creatinine, Serum mg/dL   3.4   Glucose, Serum mg/dL   97   Calcium, Total Serum mg/dL   8.1   eGFR if Non African American mL/min/1.73M2   17: Interpretative comment The units for eGFR are mL/min/1.73M2 (normalized body surface area). The eGFR is calculated from a serum creatinine using the CKD-EPI equation. Other variables required for calculation are race, age and sex. Among patients with chronic kidney disease (CKD), the eGFR is useful in determining the stage of disease according to KDOQI CKD classification. All eGFR results are reported numerically with the following interpretation. GFR With Without (ml/min/1.73 m2) Kidney Damage Kidney Damage >= 90 Stage 1 Normal 60-89 Stage 2 Decreased GFR 30-59 Stage 3 Stage 3 15-29 Stage 4 Stage 4 < 15 Stage 5 Stage 5 Each stage of CKD assumes that the associated GFR level has been in effect for at least 3 months. Determination of stages one and two (with eGFR > 59 ml/min/m2) requires estimation of kidney damage for at least 3 months as defined by structural or functional abnormalities. Limitations: All estimates of GFR will be less accurate for patients at extremes of muscle mass (including but not limited to frail elderly, critically ill, or cancer patients), those with unusual diets, and those with conditions associated with reduced secretion or extrarenal elimination of creatinine. The eGFR equation is not recommended for use in patients with unstable creatinine levels.   eGFR if African American mL/min/1.73M2   19                        9.9    6.28  )-----------( 191      ( 11 Feb 2021 11:10 )             31.8     02-11    133<L>  |  101  |  65<HH>  ----------------------------<  180<H>  5.1<H>   |  18  |  4.2<HH>    Ca    8.4<L>      11 Feb 2021 11:10      A/P:  I discussed the case with Cardiologist Dr. Brizuela and recommend the following:    S/P PTA:   PERIPHERAL DIAGNOSTIC ANGIOGRAM/INTERVENTION SUMMARY:    -Significant right popliteal lesion   -Significant right TP trunk lesion   -Significant right peroneal diffuse lesion,   -Occluded AT and severely disease PT.     Intervention:  -Successful PCI to right TP trunk/peroneal with balloon angioplasty  -Successful PCI to right Distal SFA/popliteal lesion with DCB      RECOMMENDATIONs:  -admit for observation   -IV hydration monitor kidney function  -ASA 81 mg daily  -plavix 75 mg daily  -continue statin and CCB  -Plan to DC tera if stable.                      Ambulate patient with assistance                    Increase Amlodipine to 10 mg PO Daily                   Start HCTZ 25mg PO Daily today  	     Continue DAPT ( Aspirin 81 mg PO Daily and Plavix 75 mg daily  ), CCB, HCTZ, Statin Therapy                   Patient given 30 day supply of ( Aspirin 81 mg daily and Plavix 75 mg daily ) to take at home                   Monitor access site                   No strenuous activity for 3 days (routine walking okay)                   No driving for 3 days                   No heavy lifting > 30lbs for 2 weeks                   May shower in 24 hours                   Leave dressing in place 24- 48 hours, if does not fall off in 48 hours can remove                   Patient agreeing to take DAPT as directed by cardiologist                    Post angiogram instructions, access site care and activity restrictions reviewed with patient                     Discussed with patient to return to hospital if experience severe lower extremity pain and site bleeding                   Aggressive risk factor modification, diet counseling, smoking cessation discussed with patient                      Can discharge patient from cardiovascular standpoint after ambulating without symptoms, access site wnl, labs and ECG reviewed                    Follow up with Cardiology  in 1 week.  Instructed to call and make an appointment

## 2021-02-23 DIAGNOSIS — I70.213 ATHEROSCLEROSIS OF NATIVE ARTERIES OF EXTREMITIES WITH INTERMITTENT CLAUDICATION, BILATERAL LEGS: ICD-10-CM

## 2021-02-23 DIAGNOSIS — E11.22 TYPE 2 DIABETES MELLITUS WITH DIABETIC CHRONIC KIDNEY DISEASE: ICD-10-CM

## 2021-02-23 DIAGNOSIS — H91.90 UNSPECIFIED HEARING LOSS, UNSPECIFIED EAR: ICD-10-CM

## 2021-02-23 DIAGNOSIS — E11.51 TYPE 2 DIABETES MELLITUS WITH DIABETIC PERIPHERAL ANGIOPATHY WITHOUT GANGRENE: ICD-10-CM

## 2021-02-23 DIAGNOSIS — E78.5 HYPERLIPIDEMIA, UNSPECIFIED: ICD-10-CM

## 2021-02-23 DIAGNOSIS — E11.621 TYPE 2 DIABETES MELLITUS WITH FOOT ULCER: ICD-10-CM

## 2021-02-23 DIAGNOSIS — Z79.4 LONG TERM (CURRENT) USE OF INSULIN: ICD-10-CM

## 2021-02-23 DIAGNOSIS — N18.4 CHRONIC KIDNEY DISEASE, STAGE 4 (SEVERE): ICD-10-CM

## 2021-02-23 DIAGNOSIS — Z89.421 ACQUIRED ABSENCE OF OTHER RIGHT TOE(S): ICD-10-CM

## 2021-02-23 DIAGNOSIS — I12.9 HYPERTENSIVE CHRONIC KIDNEY DISEASE WITH STAGE 1 THROUGH STAGE 4 CHRONIC KIDNEY DISEASE, OR UNSPECIFIED CHRONIC KIDNEY DISEASE: ICD-10-CM

## 2021-02-23 DIAGNOSIS — L97.519 NON-PRESSURE CHRONIC ULCER OF OTHER PART OF RIGHT FOOT WITH UNSPECIFIED SEVERITY: ICD-10-CM

## 2022-07-18 NOTE — H&P CARDIOLOGY - [facility]
CKD (chronic kidney disease), stage IV    Diabetes    HLD (hyperlipidemia)    [place] (hard of hearing)  does not perform sign language, does not wear any assistive devices  HTN (hypertension)    PAD (peripheral artery disease)  s/p PTA r SFA, PTA and ALIN 9/19  PVD (peripheral vascular disease) with claudication    
stridor at rest